# Patient Record
Sex: FEMALE | Race: BLACK OR AFRICAN AMERICAN | NOT HISPANIC OR LATINO
[De-identification: names, ages, dates, MRNs, and addresses within clinical notes are randomized per-mention and may not be internally consistent; named-entity substitution may affect disease eponyms.]

---

## 2017-09-20 PROBLEM — Z00.00 ENCOUNTER FOR PREVENTIVE HEALTH EXAMINATION: Status: ACTIVE | Noted: 2017-09-20

## 2017-10-20 ENCOUNTER — APPOINTMENT (OUTPATIENT)
Dept: GASTROENTEROLOGY | Facility: CLINIC | Age: 67
End: 2017-10-20
Payer: COMMERCIAL

## 2017-10-20 PROCEDURE — 99205 OFFICE O/P NEW HI 60 MIN: CPT

## 2017-10-20 PROCEDURE — 82274 ASSAY TEST FOR BLOOD FECAL: CPT | Mod: QW

## 2017-11-15 ENCOUNTER — APPOINTMENT (OUTPATIENT)
Dept: GASTROENTEROLOGY | Facility: CLINIC | Age: 67
End: 2017-11-15
Payer: COMMERCIAL

## 2017-11-15 PROCEDURE — 43239 EGD BIOPSY SINGLE/MULTIPLE: CPT | Mod: 59

## 2017-11-15 PROCEDURE — 45378 DIAGNOSTIC COLONOSCOPY: CPT

## 2017-12-07 ENCOUNTER — RESULT REVIEW (OUTPATIENT)
Age: 67
End: 2017-12-07

## 2018-05-08 ENCOUNTER — APPOINTMENT (OUTPATIENT)
Dept: GASTROENTEROLOGY | Facility: CLINIC | Age: 68
End: 2018-05-08

## 2018-06-28 ENCOUNTER — LABORATORY RESULT (OUTPATIENT)
Age: 68
End: 2018-06-28

## 2018-06-28 ENCOUNTER — APPOINTMENT (OUTPATIENT)
Dept: GASTROENTEROLOGY | Facility: CLINIC | Age: 68
End: 2018-06-28
Payer: MEDICARE

## 2018-06-28 VITALS
DIASTOLIC BLOOD PRESSURE: 74 MMHG | WEIGHT: 175 LBS | SYSTOLIC BLOOD PRESSURE: 128 MMHG | HEART RATE: 74 BPM | BODY MASS INDEX: 32.2 KG/M2 | HEIGHT: 62 IN

## 2018-06-28 DIAGNOSIS — F41.9 ANXIETY DISORDER, UNSPECIFIED: ICD-10-CM

## 2018-06-28 DIAGNOSIS — R73.03 PREDIABETES.: ICD-10-CM

## 2018-06-28 DIAGNOSIS — R05 COUGH: ICD-10-CM

## 2018-06-28 DIAGNOSIS — F32.9 MAJOR DEPRESSIVE DISORDER, SINGLE EPISODE, UNSPECIFIED: ICD-10-CM

## 2018-06-28 DIAGNOSIS — E78.5 HYPERLIPIDEMIA, UNSPECIFIED: ICD-10-CM

## 2018-06-28 DIAGNOSIS — Z83.79 FAMILY HISTORY OF OTHER DISEASES OF THE DIGESTIVE SYSTEM: ICD-10-CM

## 2018-06-28 PROCEDURE — 99214 OFFICE O/P EST MOD 30 MIN: CPT | Mod: 25

## 2018-06-28 PROCEDURE — 36415 COLL VENOUS BLD VENIPUNCTURE: CPT

## 2018-06-28 PROCEDURE — 82274 ASSAY TEST FOR BLOOD FECAL: CPT | Mod: QW

## 2018-06-28 RX ORDER — ASPIRIN 81 MG
81 TABLET, DELAYED RELEASE (ENTERIC COATED) ORAL
Refills: 0 | Status: ACTIVE | COMMUNITY

## 2018-06-28 RX ORDER — TELMISARTAN 80 MG/1
80 TABLET ORAL
Qty: 90 | Refills: 0 | Status: ACTIVE | COMMUNITY
Start: 2017-12-12

## 2018-06-28 RX ORDER — ATORVASTATIN CALCIUM 40 MG/1
40 TABLET, FILM COATED ORAL
Qty: 90 | Refills: 0 | Status: ACTIVE | COMMUNITY
Start: 2017-04-04

## 2018-06-29 LAB
ALBUMIN SERPL ELPH-MCNC: 4.7 G/DL
ALP BLD-CCNC: 82 U/L
ALT SERPL-CCNC: 27 U/L
ANION GAP SERPL CALC-SCNC: 16 MMOL/L
AST SERPL-CCNC: 27 U/L
BASOPHILS # BLD AUTO: 0.03 K/UL
BASOPHILS NFR BLD AUTO: 0.6 %
BILIRUB DIRECT SERPL-MCNC: 0.1 MG/DL
BILIRUB SERPL-MCNC: 0.4 MG/DL
BUN SERPL-MCNC: 15 MG/DL
CALCIUM SERPL-MCNC: 10.1 MG/DL
CHLORIDE SERPL-SCNC: 99 MMOL/L
CO2 SERPL-SCNC: 26 MMOL/L
CREAT SERPL-MCNC: 0.8 MG/DL
EOSINOPHIL # BLD AUTO: 0.15 K/UL
EOSINOPHIL NFR BLD AUTO: 3.2 %
GGT SERPL-CCNC: 22 U/L
GLUCOSE SERPL-MCNC: 115 MG/DL
HBA1C MFR BLD HPLC: 6.1 %
HCT VFR BLD CALC: 42.4 %
HGB BLD-MCNC: 13.4 G/DL
IMM GRANULOCYTES NFR BLD AUTO: 0.4 %
IRON SATN MFR SERPL: 29 %
IRON SERPL-MCNC: 90 UG/DL
LYMPHOCYTES # BLD AUTO: 1.74 K/UL
LYMPHOCYTES NFR BLD AUTO: 37 %
MAGNESIUM SERPL-MCNC: 1.8 MG/DL
MAN DIFF?: NORMAL
MCHC RBC-ENTMCNC: 30 PG
MCHC RBC-ENTMCNC: 31.6 GM/DL
MCV RBC AUTO: 95.1 FL
MONOCYTES # BLD AUTO: 0.49 K/UL
MONOCYTES NFR BLD AUTO: 10.4 %
NEUTROPHILS # BLD AUTO: 2.27 K/UL
NEUTROPHILS NFR BLD AUTO: 48.4 %
PHOSPHATE SERPL-MCNC: 3.1 MG/DL
PLATELET # BLD AUTO: 309 K/UL
POTASSIUM SERPL-SCNC: 4.4 MMOL/L
PROT SERPL-MCNC: 7.9 G/DL
RBC # BLD: 4.46 M/UL
RBC # FLD: 14.4 %
SODIUM SERPL-SCNC: 141 MMOL/L
TIBC SERPL-MCNC: 312 UG/DL
UIBC SERPL-MCNC: 222 UG/DL
WBC # FLD AUTO: 4.7 K/UL

## 2018-07-02 LAB
FERRITIN SERPL-MCNC: 389 NG/ML
T3 SERPL-MCNC: 127 NG/DL
T3RU NFR SERPL: 1.18 INDEX
T4 FREE SERPL-MCNC: 1.1 NG/DL
T4 SERPL-MCNC: 8 UG/DL
TSH SERPL-ACNC: 2.06 UIU/ML

## 2019-02-11 ENCOUNTER — INBOUND DOCUMENT (OUTPATIENT)
Age: 69
End: 2019-02-11

## 2019-08-09 ENCOUNTER — RX RENEWAL (OUTPATIENT)
Age: 69
End: 2019-08-09

## 2019-10-10 ENCOUNTER — APPOINTMENT (OUTPATIENT)
Dept: GASTROENTEROLOGY | Facility: CLINIC | Age: 69
End: 2019-10-10
Payer: MEDICARE

## 2019-10-10 VITALS
HEIGHT: 62 IN | WEIGHT: 171 LBS | BODY MASS INDEX: 31.47 KG/M2 | DIASTOLIC BLOOD PRESSURE: 66 MMHG | SYSTOLIC BLOOD PRESSURE: 129 MMHG | HEART RATE: 71 BPM

## 2019-10-10 DIAGNOSIS — Z86.010 PERSONAL HISTORY OF COLONIC POLYPS: ICD-10-CM

## 2019-10-10 DIAGNOSIS — K25.9 GASTRIC ULCER, UNSPECIFIED AS ACUTE OR CHRONIC, W/OUT HEMORRHAGE OR PERFORATION: ICD-10-CM

## 2019-10-10 DIAGNOSIS — H40.9 UNSPECIFIED GLAUCOMA: ICD-10-CM

## 2019-10-10 PROCEDURE — 99214 OFFICE O/P EST MOD 30 MIN: CPT

## 2019-10-10 PROCEDURE — 82274 ASSAY TEST FOR BLOOD FECAL: CPT | Mod: QW

## 2019-10-10 NOTE — PHYSICAL EXAM
[General Appearance - Alert] : alert [General Appearance - Well Nourished] : well nourished [General Appearance - In No Acute Distress] : in no acute distress [General Appearance - Well Developed] : well developed [Sclera] : the sclera and conjunctiva were normal [Outer Ear] : the ears and nose were normal in appearance [Oropharynx] : the oropharynx was normal [Neck Appearance] : the appearance of the neck was normal [Neck Cervical Mass (___cm)] : no neck mass was observed [Jugular Venous Distention Increased] : there was no jugular-venous distention [Thyroid Nodule] : there were no palpable thyroid nodules [Thyroid Diffuse Enlargement] : the thyroid was not enlarged [Auscultation Breath Sounds / Voice Sounds] : lungs were clear to auscultation bilaterally [Heart Sounds] : normal S1 and S2 [Heart Sounds Gallop] : no gallops [Heart Rate And Rhythm] : heart rate was normal and rhythm regular [Heart Sounds Pericardial Friction Rub] : no pericardial rub [Murmurs] : no murmurs [Edema] : there was no peripheral edema [Full Pulse] : the pedal pulses are present [Abdomen Soft] : soft [Bowel Sounds] : normal bowel sounds [Abdomen Tenderness] : non-tender [Abdomen Hernia] : no hernia was discovered [Abdomen Mass (___ Cm)] : no abdominal mass palpated [Normal Sphincter Tone] : normal sphincter tone [No Rectal Mass] : no rectal mass [Cervical Lymph Nodes Enlarged Posterior Bilaterally] : posterior cervical [Cervical Lymph Nodes Enlarged Anterior Bilaterally] : anterior cervical [Supraclavicular Lymph Nodes Enlarged Bilaterally] : supraclavicular [Axillary Lymph Nodes Enlarged Bilaterally] : axillary [Femoral Lymph Nodes Enlarged Bilaterally] : femoral [Inguinal Lymph Nodes Enlarged Bilaterally] : inguinal [No CVA Tenderness] : no ~M costovertebral angle tenderness [No Spinal Tenderness] : no spinal tenderness [Abnormal Walk] : normal gait [Nail Clubbing] : no clubbing  or cyanosis of the fingernails [Motor Tone] : muscle strength and tone were normal [Musculoskeletal - Swelling] : no joint swelling seen [Skin Color & Pigmentation] : normal skin color and pigmentation [Skin Turgor] : normal skin turgor [] : no rash [Oriented To Time, Place, And Person] : oriented to person, place, and time [Affect] : the affect was normal [Impaired Insight] : insight and judgment were intact [Internal Hemorrhoid] : no internal hemorrhoids [External Hemorrhoid] : no external hemorrhoids [FreeTextEntry1] : FIT negative [Occult Blood Positive] : stool was negative for occult blood

## 2019-10-10 NOTE — REVIEW OF SYSTEMS
[Eyesight Problems] : eyesight problems [Dry Eyes] : dryness of the eyes [Lower Ext Edema] : lower extremity edema [Cough] : cough [Hot Flashes] : hot flashes [Negative] : Heme/Lymph

## 2019-10-10 NOTE — ASSESSMENT
[FreeTextEntry1] : 1. Iron deficiency anemia may be related to the ASA-induced gastric erosions and erosive esophagitis noted at EGD November 2017, possible colorectal neoplasia, etc..\par 2. Remote history of colonic polyps, with incomplete colonoscopy November 2017; family history of colonic polyps (sisters). Stool guaiac negative with negative FIT on today's exam. \par 3. Obesity.\par 4. Hypertension.\par 5. Hyperlipidemia.\par 6. Prediabetes.\par 7. History of TIA, maintained on baby aspirin.\par 8. History of anxiety/depression.\par 9. Chronic cough, attributed to Micardis.\par 10. Glaucoma, possible optic nerve disease. \par 11. Status post total abdominal hysterectomy, left breast lumpectomy, lipoma excision.\par \par Plan:\par 1. Dr. Azul to forward labs for my review.\par 2. Continue PPI as co-therapy with aspirin indefinitely.\par 3. Advised another attempt at colonoscopy (to visualize the remainder of the colon) this autumn, after eye issues have been fully elucidated. Procedure, rationale, alternatives, material risks, anesthesia plan, MiraLax prep instructions were reviewed and brochure given.\par \par

## 2019-10-10 NOTE — CONSULT LETTER
[Dear  ___] : Dear  [unfilled], [Courtesy Letter:] : I had the pleasure of seeing your patient, [unfilled], in my office today. [Please see my note below.] : Please see my note below. [Sincerely,] : Sincerely, [Consult Closing:] : Thank you very much for allowing me to participate in the care of this patient.  If you have any questions, please do not hesitate to contact me. [FreeTextEntry3] : Reginaldo Edouard M.D.\par

## 2019-10-10 NOTE — HISTORY OF PRESENT ILLNESS
[FreeTextEntry1] : Reflux symptoms continue to be well-controlled on omeprazole 40 mg daily, with "less acidy stomach" and less burping than at last visit here. EGD 11/15/17 revealed gastric erosions (ASA-induced), grade A erosive esophagitis, and hiatal hernia with patulous Schatzki ring. Repeat colonoscopy 11/15/17 to the ?mid-ascending colon (proximal right colon not visualized) revealed tortuosity and looping of the scope, with hemorrhoids; virtual colonoscopy has been advised on several occasions, but she has never followed through with that recommendation. She continues on ASA & iron supplement daily. Since last visit, she was diagnosed with glaucoma and possible optic nerve issue, for which she will be undergoing MRI shortly (although she has claustrophobia, so needs to contact Dr. Azul about pre-medication for the study).

## 2020-10-13 ENCOUNTER — RX RENEWAL (OUTPATIENT)
Age: 70
End: 2020-10-13

## 2020-11-30 ENCOUNTER — RX RENEWAL (OUTPATIENT)
Age: 70
End: 2020-11-30

## 2021-01-13 ENCOUNTER — RX RENEWAL (OUTPATIENT)
Age: 71
End: 2021-01-13

## 2021-06-09 ENCOUNTER — RX RENEWAL (OUTPATIENT)
Age: 71
End: 2021-06-09

## 2021-08-02 ENCOUNTER — RX RENEWAL (OUTPATIENT)
Age: 71
End: 2021-08-02

## 2021-08-16 ENCOUNTER — APPOINTMENT (OUTPATIENT)
Dept: OBGYN | Facility: CLINIC | Age: 71
End: 2021-08-16
Payer: MEDICARE

## 2021-08-16 ENCOUNTER — NON-APPOINTMENT (OUTPATIENT)
Age: 71
End: 2021-08-16

## 2021-08-16 VITALS
WEIGHT: 178 LBS | DIASTOLIC BLOOD PRESSURE: 80 MMHG | BODY MASS INDEX: 32.76 KG/M2 | HEIGHT: 62 IN | SYSTOLIC BLOOD PRESSURE: 120 MMHG

## 2021-08-16 DIAGNOSIS — Z86.73 PERSONAL HISTORY OF TRANSIENT ISCHEMIC ATTACK (TIA), AND CEREBRAL INFARCTION W/OUT RESIDUAL DEFICITS: ICD-10-CM

## 2021-08-16 DIAGNOSIS — I10 ESSENTIAL (PRIMARY) HYPERTENSION: ICD-10-CM

## 2021-08-16 DIAGNOSIS — Z86.59 PERSONAL HISTORY OF OTHER MENTAL AND BEHAVIORAL DISORDERS: ICD-10-CM

## 2021-08-16 DIAGNOSIS — Z80.3 FAMILY HISTORY OF MALIGNANT NEOPLASM OF BREAST: ICD-10-CM

## 2021-08-16 DIAGNOSIS — Z11.51 ENCOUNTER FOR SCREENING FOR HUMAN PAPILLOMAVIRUS (HPV): ICD-10-CM

## 2021-08-16 DIAGNOSIS — Z83.3 FAMILY HISTORY OF DIABETES MELLITUS: ICD-10-CM

## 2021-08-16 DIAGNOSIS — Z86.69 PERSONAL HISTORY OF OTHER DISEASES OF THE NERVOUS SYSTEM AND SENSE ORGANS: ICD-10-CM

## 2021-08-16 DIAGNOSIS — Z86.79 PERSONAL HISTORY OF OTHER DISEASES OF THE CIRCULATORY SYSTEM: ICD-10-CM

## 2021-08-16 PROCEDURE — 99397 PER PM REEVAL EST PAT 65+ YR: CPT

## 2021-08-16 RX ORDER — METOPROLOL SUCCINATE 100 MG/1
100 TABLET, EXTENDED RELEASE ORAL
Qty: 90 | Refills: 0 | Status: DISCONTINUED | COMMUNITY
Start: 2018-03-06 | End: 2021-08-16

## 2021-08-16 RX ORDER — OMEPRAZOLE 40 MG/1
40 CAPSULE, DELAYED RELEASE ORAL
Qty: 90 | Refills: 0 | Status: DISCONTINUED | COMMUNITY
Start: 2017-11-15 | End: 2021-08-16

## 2021-08-16 RX ORDER — METOPROLOL SUCCINATE 50 MG/1
50 TABLET, EXTENDED RELEASE ORAL
Qty: 90 | Refills: 0 | Status: DISCONTINUED | COMMUNITY
Start: 2018-06-12 | End: 2021-08-16

## 2021-08-16 RX ORDER — HYDROCHLOROTHIAZIDE 25 MG/1
25 TABLET ORAL
Qty: 30 | Refills: 0 | Status: DISCONTINUED | COMMUNITY
Start: 2017-12-20 | End: 2021-08-16

## 2021-08-19 LAB
CYTOLOGY CVX/VAG DOC THIN PREP: ABNORMAL
HPV HIGH+LOW RISK DNA PNL CVX: NOT DETECTED

## 2021-08-22 PROBLEM — Z86.79 HISTORY OF HYPERTENSION: Status: RESOLVED | Noted: 2021-08-16 | Resolved: 2021-08-22

## 2021-08-22 PROBLEM — Z86.59 HISTORY OF DEPRESSION: Status: RESOLVED | Noted: 2021-08-16 | Resolved: 2021-08-22

## 2021-08-22 PROBLEM — Z80.3 FAMILY HISTORY OF MALIGNANT NEOPLASM OF BREAST: Status: ACTIVE | Noted: 2021-08-16

## 2021-08-22 PROBLEM — I10 HYPERTENSION, UNSPECIFIED TYPE: Status: ACTIVE | Noted: 2018-06-28

## 2021-08-22 PROBLEM — Z86.73 HISTORY OF TIA (TRANSIENT ISCHEMIC ATTACK): Status: ACTIVE | Noted: 2018-06-28

## 2021-08-22 PROBLEM — Z86.69 HISTORY OF GLAUCOMA: Status: RESOLVED | Noted: 2021-08-16 | Resolved: 2021-08-22

## 2021-08-22 PROBLEM — Z83.3 FAMILY HISTORY OF DIABETES MELLITUS: Status: ACTIVE | Noted: 2021-08-16

## 2021-08-22 RX ORDER — SPIRONOLACTONE 50 MG/1
TABLET ORAL
Refills: 0 | Status: ACTIVE | COMMUNITY

## 2021-08-22 RX ORDER — AMLODIPINE BESYLATE 5 MG/1
5 TABLET ORAL
Refills: 0 | Status: ACTIVE | COMMUNITY
Start: 2018-03-06

## 2021-08-22 RX ORDER — FUROSEMIDE 80 MG/1
TABLET ORAL
Refills: 0 | Status: ACTIVE | COMMUNITY

## 2021-08-22 RX ORDER — CARVEDILOL 3.12 MG/1
TABLET, FILM COATED ORAL
Refills: 0 | Status: ACTIVE | COMMUNITY

## 2022-02-28 ENCOUNTER — LABORATORY RESULT (OUTPATIENT)
Age: 72
End: 2022-02-28

## 2022-02-28 ENCOUNTER — APPOINTMENT (OUTPATIENT)
Dept: GASTROENTEROLOGY | Facility: CLINIC | Age: 72
End: 2022-02-28
Payer: MEDICARE

## 2022-02-28 VITALS
WEIGHT: 178 LBS | SYSTOLIC BLOOD PRESSURE: 139 MMHG | HEART RATE: 76 BPM | DIASTOLIC BLOOD PRESSURE: 65 MMHG | BODY MASS INDEX: 32.76 KG/M2 | HEIGHT: 62 IN

## 2022-02-28 DIAGNOSIS — D50.9 IRON DEFICIENCY ANEMIA, UNSPECIFIED: ICD-10-CM

## 2022-02-28 DIAGNOSIS — E66.9 OBESITY, UNSPECIFIED: ICD-10-CM

## 2022-02-28 DIAGNOSIS — R14.0 ABDOMINAL DISTENSION (GASEOUS): ICD-10-CM

## 2022-02-28 DIAGNOSIS — K21.00 GASTRO-ESOPHAGEAL REFLUX DISEASE WITH ESOPHAGITIS, WITHOUT BLEEDING: ICD-10-CM

## 2022-02-28 PROCEDURE — 82274 ASSAY TEST FOR BLOOD FECAL: CPT | Mod: QW

## 2022-02-28 PROCEDURE — 36415 COLL VENOUS BLD VENIPUNCTURE: CPT

## 2022-02-28 PROCEDURE — 99215 OFFICE O/P EST HI 40 MIN: CPT | Mod: 25

## 2022-02-28 RX ORDER — PAROXETINE HYDROCHLORIDE 20 MG/1
20 TABLET, FILM COATED ORAL
Qty: 30 | Refills: 0 | Status: DISCONTINUED | COMMUNITY
Start: 2017-11-28 | End: 2022-02-28

## 2022-02-28 NOTE — REVIEW OF SYSTEMS
[Cough] : cough [Constipation] : constipation [Heartburn] : heartburn [Anxiety] : anxiety [Negative] : Heme/Lymph [As Noted in HPI] : as noted in HPI

## 2022-02-28 NOTE — CONSULT LETTER
[Dear  ___] : Dear  [unfilled], [Courtesy Letter:] : I had the pleasure of seeing your patient, [unfilled], in my office today. [Please see my note below.] : Please see my note below. [Consult Closing:] : Thank you very much for allowing me to participate in the care of this patient.  If you have any questions, please do not hesitate to contact me. [Sincerely,] : Sincerely, [FreeTextEntry3] : Reginaldo Edouard M.D.\par

## 2022-02-28 NOTE — PHYSICAL EXAM
[General Appearance - Alert] : alert [General Appearance - In No Acute Distress] : in no acute distress [General Appearance - Well Nourished] : well nourished [General Appearance - Well Developed] : well developed [Sclera] : the sclera and conjunctiva were normal [Neck Appearance] : the appearance of the neck was normal [Neck Cervical Mass (___cm)] : no neck mass was observed [Jugular Venous Distention Increased] : there was no jugular-venous distention [Thyroid Diffuse Enlargement] : the thyroid was not enlarged [Thyroid Nodule] : there were no palpable thyroid nodules [Auscultation Breath Sounds / Voice Sounds] : lungs were clear to auscultation bilaterally [Heart Rate And Rhythm] : heart rate was normal and rhythm regular [Heart Sounds] : normal S1 and S2 [Heart Sounds Gallop] : no gallops [Murmurs] : no murmurs [Heart Sounds Pericardial Friction Rub] : no pericardial rub [Full Pulse] : the pedal pulses are present [Edema] : there was no peripheral edema [Bowel Sounds] : normal bowel sounds [Abdomen Soft] : soft [Abdomen Tenderness] : non-tender [Abdomen Mass (___ Cm)] : no abdominal mass palpated [Abdomen Hernia] : no hernia was discovered [Normal Sphincter Tone] : normal sphincter tone [No Rectal Mass] : no rectal mass [Internal Hemorrhoid] : internal hemorrhoids [Cervical Lymph Nodes Enlarged Posterior Bilaterally] : posterior cervical [Cervical Lymph Nodes Enlarged Anterior Bilaterally] : anterior cervical [Supraclavicular Lymph Nodes Enlarged Bilaterally] : supraclavicular [Inguinal Lymph Nodes Enlarged Bilaterally] : inguinal [Nail Clubbing] : no clubbing  or cyanosis of the fingernails [Musculoskeletal - Swelling] : no joint swelling seen [Skin Color & Pigmentation] : normal skin color and pigmentation [Skin Turgor] : normal skin turgor [] : no rash [Oriented To Time, Place, And Person] : oriented to person, place, and time [Impaired Insight] : insight and judgment were intact [Affect] : the affect was normal [External Hemorrhoid] : no external hemorrhoids [Occult Blood Positive] : stool was negative for occult blood [FreeTextEntry1] : FIT negative

## 2022-02-28 NOTE — ASSESSMENT
[FreeTextEntry1] : 1.  Elevated liver chemistries, with ultrasound reportedly revealing fatty liver--multiple risk factors.  She has had increased risk for evolution towards cirrhosis, HCC.  Rule out superimposed viral and/or autoimmune hepatitis, drug-induced liver injury, celiac disease, etc.\par 2.  History of iron deficiency anemia, may have been related to the ASA-induced gastric erosions and erosive esophagitis noted at EGD November 2017.\par 3. Remote history of colonic polyps, with incomplete colonoscopy November 2017; family history of colonic polyps (sisters). Stool guaiac negative with negative FIT on today's exam.  Gassiness may be related to IBS-C, medicines, diet, etc.\par 4. Obesity.\par 5. Hypertension.\par 6. Hyperlipidemia.\par 7. Prediabetes.\par 8. History of TIA, maintained on baby aspirin.\par 9. History of anxiety/depression.\par 10. Chronic cough, attributed to Micardis.\par 11. Glaucoma, possible optic nerve disease. \par 12. Status post total abdominal hysterectomy, left breast lumpectomy, lipoma excision.\par \par Plan:\par 1. Dr. Azul to forward lab and ultrasound reports for my review.\par 2. Extensive bloodwork drawn by me this afternoon.\par 3. Diet, lose weight (aim for >10% loss), exercise more, and continue abstinence from alcohol. \par 4. Handout on "NAFLD" given and discussed. \par 5. Return here later this spring.\par 6. Consider another attempt at colonoscopy later this year, if the potential benefits exceed the risks at that point.

## 2022-02-28 NOTE — HISTORY OF PRESENT ILLNESS
[FreeTextEntry1] : Gregoria was advised of abnormal liver chemistries last year, and once again earlier this year.  Abdominal ultrasound several weeks ago reportedly revealed fatty liver.  She reports chronic gassiness, the bloating and flatulence.  She also tends towards constipation, taking 2 Senokot at bedtime.  She wonders if the -statin could be the cause of the elevated chemistries.  Repeat colonoscopy November 2017 to the?  Mid-ascending colon revealed tortuosity and looping of the scope, with hemorrhoids; virtual colonoscopy as advised on several occasions, because of claustrophobia, etc., she has never proceeded with that recommendation.  Same day EGD revealed aspirin due to gastric erosions, grade A erosive esophagitis, hiatal hernia with patulous Schatzki ring.

## 2022-02-28 NOTE — REASON FOR VISIT
[Follow-Up: _____] : a [unfilled] follow-up visit [FreeTextEntry1] : Pt has fatty liver, liver blood count high

## 2022-03-01 LAB
APTT BLD: 28.5 SEC
BASOPHILS # BLD AUTO: 0.08 K/UL
BASOPHILS NFR BLD AUTO: 1.2 %
EOSINOPHIL # BLD AUTO: 0.38 K/UL
EOSINOPHIL NFR BLD AUTO: 5.9 %
ESTIMATED AVERAGE GLUCOSE: 126 MG/DL
HBA1C MFR BLD HPLC: 6 %
HBV CORE IGG+IGM SER QL: NONREACTIVE
HBV SURFACE AB SER QL: NONREACTIVE
HBV SURFACE AG SER QL: NONREACTIVE
HCT VFR BLD CALC: 39.9 %
HCV AB SER QL: NONREACTIVE
HCV S/CO RATIO: 0.14 S/CO
HEPATITIS A IGG ANTIBODY: NONREACTIVE
HGB BLD-MCNC: 13.1 G/DL
IMM GRANULOCYTES NFR BLD AUTO: 0.3 %
INR PPP: 1.12 RATIO
LYMPHOCYTES # BLD AUTO: 2.45 K/UL
LYMPHOCYTES NFR BLD AUTO: 38.1 %
MAN DIFF?: NORMAL
MCHC RBC-ENTMCNC: 31.8 PG
MCHC RBC-ENTMCNC: 32.8 GM/DL
MCV RBC AUTO: 96.8 FL
MONOCYTES # BLD AUTO: 0.64 K/UL
MONOCYTES NFR BLD AUTO: 10 %
NEUTROPHILS # BLD AUTO: 2.86 K/UL
NEUTROPHILS NFR BLD AUTO: 44.5 %
PLATELET # BLD AUTO: 282 K/UL
PT BLD: 13.1 SEC
RBC # BLD: 4.12 M/UL
RBC # FLD: 13.9 %
WBC # FLD AUTO: 6.43 K/UL

## 2022-03-02 LAB
AFP-TM SERPL-MCNC: 9.3 NG/ML
ALBUMIN SERPL ELPH-MCNC: 4.5 G/DL
ALP BLD-CCNC: 89 U/L
ALT SERPL-CCNC: 81 U/L
AMYLASE/CREAT SERPL: 110 U/L
ANION GAP SERPL CALC-SCNC: 14 MMOL/L
AST SERPL-CCNC: 56 U/L
BILIRUB DIRECT SERPL-MCNC: 0.1 MG/DL
BILIRUB SERPL-MCNC: 0.3 MG/DL
BUN SERPL-MCNC: 11 MG/DL
CALCIUM SERPL-MCNC: 9.7 MG/DL
CHLORIDE SERPL-SCNC: 103 MMOL/L
CHOLEST SERPL-MCNC: 153 MG/DL
CO2 SERPL-SCNC: 23 MMOL/L
CREAT SERPL-MCNC: 0.8 MG/DL
EGFR: 79 ML/MIN/1.73M2
FERRITIN SERPL-MCNC: 787 NG/ML
GGT SERPL-CCNC: 34 U/L
GLUCOSE SERPL-MCNC: 89 MG/DL
HDLC SERPL-MCNC: 56 MG/DL
IGA SER QL IEP: 440 MG/DL
IRON SATN MFR SERPL: 39 %
IRON SERPL-MCNC: 109 UG/DL
LDLC SERPL CALC-MCNC: 79 MG/DL
LPL SERPL-CCNC: 30 U/L
MAGNESIUM SERPL-MCNC: 1.8 MG/DL
NONHDLC SERPL-MCNC: 97 MG/DL
PHOSPHATE SERPL-MCNC: 3.6 MG/DL
POTASSIUM SERPL-SCNC: 4.4 MMOL/L
PROT SERPL-MCNC: 8 G/DL
SODIUM SERPL-SCNC: 140 MMOL/L
T3 SERPL-MCNC: 141 NG/DL
T3RU NFR SERPL: 1.3 TBI
T4 FREE SERPL-MCNC: 1 NG/DL
T4 SERPL-MCNC: 8.7 UG/DL
TIBC SERPL-MCNC: 281 UG/DL
TRIGL SERPL-MCNC: 90 MG/DL
TSH SERPL-ACNC: 1.78 UIU/ML
UIBC SERPL-MCNC: 172 UG/DL

## 2022-03-03 LAB
GLIADIN IGA SER QL: 9.6 UNITS
GLIADIN IGG SER QL: <5 UNITS
GLIADIN PEPTIDE IGA SER-ACNC: NEGATIVE
GLIADIN PEPTIDE IGG SER-ACNC: NEGATIVE
TTG IGA SER IA-ACNC: <1.2 U/ML
TTG IGA SER-ACNC: NEGATIVE
TTG IGG SER IA-ACNC: 5.5 U/ML
TTG IGG SER IA-ACNC: NEGATIVE

## 2022-03-04 ENCOUNTER — NON-APPOINTMENT (OUTPATIENT)
Age: 72
End: 2022-03-04

## 2022-03-07 ENCOUNTER — NON-APPOINTMENT (OUTPATIENT)
Age: 72
End: 2022-03-07

## 2022-03-07 LAB
ANA PAT FLD IF-IMP: ABNORMAL
ANA SER IF-ACNC: ABNORMAL
MITOCHONDRIA AB SER IF-ACNC: NORMAL
SMOOTH MUSCLE AB SER QL IF: NORMAL

## 2022-03-07 RX ORDER — GLUC/MSM/COLGN2/HYAL/ANTIARTH3 375-375-20
TABLET ORAL
Refills: 0 | Status: DISCONTINUED | COMMUNITY
End: 2022-03-07

## 2022-03-10 LAB
ENDOMYSIUM IGA SER QL: NEGATIVE
ENDOMYSIUM IGA TITR SER: NORMAL

## 2022-06-07 ENCOUNTER — APPOINTMENT (OUTPATIENT)
Dept: GASTROENTEROLOGY | Facility: CLINIC | Age: 72
End: 2022-06-07
Payer: MEDICARE

## 2022-06-07 VITALS
HEIGHT: 62 IN | SYSTOLIC BLOOD PRESSURE: 131 MMHG | WEIGHT: 179 LBS | HEART RATE: 78 BPM | BODY MASS INDEX: 32.94 KG/M2 | DIASTOLIC BLOOD PRESSURE: 64 MMHG

## 2022-06-07 PROCEDURE — 99214 OFFICE O/P EST MOD 30 MIN: CPT

## 2022-06-07 NOTE — ASSESSMENT
[FreeTextEntry1] : 1.  Mildly elevated liver chemistries, with elevated ferritin and HERSON and AFP, and ultrasound suggesting fatty liver--I suspect NAFLD, with secondary iron overload.  Possible autoimmune hepatitis but I doubt that she would be a candidate for treatment.  Drug-induced liver injury would be another possibility.  No immunity vs. hepatitis A&B.\par 2.  History of iron deficiency anemia, may have been related to the ASA-induced gastric erosions and erosive esophagitis noted at EGD November 2017.\par 3. Remote history of colonic polyps, with incomplete colonoscopy November 2017; family history of colonic polyps (sisters). Stool guaiac negative with negative FIT on today's exam.  Gassiness may be related to IBS-C, medicines, diet, etc.\par 4. Obesity.\par 5. Hypertension.\par 6. Hyperlipidemia.\par 7. Prediabetes.\par 8. History of TIA, maintained on baby aspirin.\par 9. History of anxiety/depression.\par 10. Chronic cough, attributed to Micardis.\par 11. Glaucoma, possible optic nerve disease. \par 12. Status post total abdominal hysterectomy, left breast lumpectomy, lipoma excision.\par \par Plan:\par 1.  Dr. Azul to forward latest lab report for my review.\par 2.  At this point, although liver biopsy could be performed to further elucidate, I suspect that the potential risks may outweigh the benefits.\par 3.  Nevertheless, suggest formal consultation with Hepatology.  She should receive "standard" vaccinations.  FibroScan may be recommended as a baseline.  They could also give input regarding whether they feel that liver biopsy is justified.\par 4.  Return this autumn, for further discussion regarding surveillance colonoscopy.\par 5.  Dietary instruction given. Continue attempts at gradual weight loss, and minimize "high-iron" food or vitamin that contains iron.

## 2022-06-07 NOTE — HISTORY OF PRESENT ILLNESS
[FreeTextEntry1] : Labs from 2/28/2022 revealed AST 56, ALT 81, ferritin 787, HERSON 1:1280 (homogeneous), and AFP 9.3.  Abdominal ultrasound 2/4/2022 revealed mild increased hepatic echogenicity without liver mass or atilio cirrhosis.  She had held her cholesterol medicine for a while, but because of history of TIA and other co-morbidities, this was just resumed last week. She thought that she had lost a few pounds since last visit, and had labs repeated recently, advised of improvement (not currently available for my review).

## 2022-06-07 NOTE — REVIEW OF SYSTEMS
[Dry Eyes] : dryness of the eyes [Eyes Itch] : itching of the eyes [Cough] : cough [Negative] : Heme/Lymph

## 2022-10-31 ENCOUNTER — APPOINTMENT (OUTPATIENT)
Dept: OBGYN | Facility: CLINIC | Age: 72
End: 2022-10-31

## 2022-10-31 VITALS
SYSTOLIC BLOOD PRESSURE: 126 MMHG | WEIGHT: 178 LBS | BODY MASS INDEX: 32.76 KG/M2 | HEIGHT: 62 IN | DIASTOLIC BLOOD PRESSURE: 70 MMHG

## 2022-10-31 DIAGNOSIS — Z01.411 ENCOUNTER FOR GYNECOLOGICAL EXAMINATION (GENERAL) (ROUTINE) WITH ABNORMAL FINDINGS: ICD-10-CM

## 2022-10-31 DIAGNOSIS — B97.7 PAPILLOMAVIRUS AS THE CAUSE OF DISEASES CLASSIFIED ELSEWHERE: ICD-10-CM

## 2022-10-31 PROCEDURE — 99213 OFFICE O/P EST LOW 20 MIN: CPT

## 2022-11-02 ENCOUNTER — APPOINTMENT (OUTPATIENT)
Dept: GASTROENTEROLOGY | Facility: CLINIC | Age: 72
End: 2022-11-02

## 2022-11-02 VITALS
DIASTOLIC BLOOD PRESSURE: 69 MMHG | HEART RATE: 67 BPM | HEIGHT: 62 IN | WEIGHT: 176 LBS | SYSTOLIC BLOOD PRESSURE: 134 MMHG | BODY MASS INDEX: 32.39 KG/M2

## 2022-11-02 DIAGNOSIS — I50.9 HEART FAILURE, UNSPECIFIED: ICD-10-CM

## 2022-11-02 DIAGNOSIS — K76.0 FATTY (CHANGE OF) LIVER, NOT ELSEWHERE CLASSIFIED: ICD-10-CM

## 2022-11-02 DIAGNOSIS — Z83.71 FAMILY HISTORY OF COLONIC POLYPS: ICD-10-CM

## 2022-11-02 DIAGNOSIS — R74.8 ABNORMAL LEVELS OF OTHER SERUM ENZYMES: ICD-10-CM

## 2022-11-02 DIAGNOSIS — K59.09 OTHER CONSTIPATION: ICD-10-CM

## 2022-11-02 PROCEDURE — 99214 OFFICE O/P EST MOD 30 MIN: CPT

## 2022-11-02 NOTE — PHYSICAL EXAM
[No Acute Distress] : no acute distress [Well Developed] : well developed [Well Nourished] : well nourished [Obese (BMI >= 30)] : obese (BMI >= 30) [None] : no edema [Bowel Sounds] : normal bowel sounds [Abdomen Tenderness] : non-tender [Normal Color / Pigmentation] : normal skin color and pigmentation [Normal] : oriented to person, place, and time [de-identified] : surgical scars

## 2022-11-02 NOTE — CONSULT LETTER
[Dear  ___] : Dear  [unfilled], [Courtesy Letter:] : I had the pleasure of seeing your patient, [unfilled], in my office today. [Please see my note below.] : Please see my note below. [Consult Closing:] : Thank you very much for allowing me to participate in the care of this patient.  If you have any questions, please do not hesitate to contact me. [Sincerely,] : Sincerely, [FreeTextEntry3] : Reginaldo Edouard M.D.\par  [DrShabbir  ___] : Dr. JEFFRIES

## 2022-11-02 NOTE — HISTORY OF PRESENT ILLNESS
[FreeTextEntry1] : Gregoria reports issues with intermittent constipation and gassiness.  She takes one Senokot with stool softener every evening.  She has remote history of colonic polyps, with incomplete colonoscopy November 2017.  Her sisters had colonic polyps.  She also reports intermittent heartburn, does not take any medicine for that.  She mentioned that she was diagnosed with congestive heart failure 3-4 years ago, followed by Dr. Stewart Guerra ([facility]).  Repeat labs revealed improving aminotransferases.  She will be traveling to [place] over Thanksgiving.

## 2022-11-02 NOTE — ASSESSMENT
[FreeTextEntry1] : 1. Remote history of colonic polyps, with extremely difficult incomplete colonoscopy November 2017; family history of colonic polyps (sisters)--rule out colorectal neoplasia.  Constipation, gassiness may be related to IBS-C, medicines.\par 2. Mildly elevated liver chemistries, with elevated ferritin and HERSON and AFP, and ultrasound suggesting fatty liver--I suspect NAFLD.\par 3. History of iron deficiency anemia, may have been related to the ASA-induced gastric erosions and erosive esophagitis noted at EGD November 2017.  Occasional heartburn.\par 4. History of CHF, followed by Dr. Guerra.\par 5. Obesity.\par 6. Hypertension.\par 7. Hyperlipidemia.\par 8. Prediabetes.\par 9. History of TIA, maintained on baby aspirin.\par 10. History of anxiety/depression.\par 11. Chronic cough, attributed to Micardis.\par 12. Glaucoma, possible optic nerve disease. \par 13. Status post total abdominal hysterectomy, left breast lumpectomy, lipoma excision.\par \par Plan:\par 1.  Interim medical records reviewed.\par 2.  Given history of CHF, I am concerned about whether she is even a candidate for repeat colonoscopy.  She has an appointment to see Dr. Guerra later this month--cardiac evaluation necessary before contemplating repeat colonoscopy.  Given history of CHF, she would not be suitable for office anesthesia, so Dr. Gilliland could perform at the hospital on my behalf (if she is a candidate at all).\par 3.  Continue current bowel regimen.\par 4.  Return here after cardiac evaluation has been completed, for further discussion.

## 2022-11-07 LAB — CYTOLOGY CVX/VAG DOC THIN PREP: NORMAL

## 2023-04-22 RX ORDER — PAROXETINE HYDROCHLORIDE 20 MG/1
20 TABLET, FILM COATED ORAL DAILY
Qty: 90 | Refills: 0 | Status: ACTIVE | COMMUNITY
Start: 2022-01-19 | End: 1900-01-01

## 2023-05-22 ENCOUNTER — NON-APPOINTMENT (OUTPATIENT)
Age: 73
End: 2023-05-22

## 2023-12-05 ENCOUNTER — APPOINTMENT (OUTPATIENT)
Dept: OBGYN | Facility: CLINIC | Age: 73
End: 2023-12-05
Payer: MEDICARE

## 2023-12-05 VITALS
DIASTOLIC BLOOD PRESSURE: 74 MMHG | BODY MASS INDEX: 31.47 KG/M2 | HEIGHT: 62 IN | WEIGHT: 171 LBS | SYSTOLIC BLOOD PRESSURE: 120 MMHG

## 2023-12-05 DIAGNOSIS — Z01.419 ENCOUNTER FOR GYNECOLOGICAL EXAMINATION (GENERAL) (ROUTINE) W/OUT ABNORMAL FINDINGS: ICD-10-CM

## 2023-12-05 PROCEDURE — 99212 OFFICE O/P EST SF 10 MIN: CPT

## 2023-12-05 PROCEDURE — 99397 PER PM REEVAL EST PAT 65+ YR: CPT

## 2023-12-05 RX ORDER — GLUC/MSM/COLGN2/HYAL/ANTIARTH3 375-375-20
TABLET ORAL
Refills: 0 | Status: DISCONTINUED | COMMUNITY
End: 2023-12-05

## 2023-12-05 RX ORDER — LATANOPROST/PF 0.005 %
0.01 DROPS OPHTHALMIC (EYE)
Refills: 0 | Status: DISCONTINUED | COMMUNITY
End: 2023-12-05

## 2023-12-05 RX ORDER — FLUOCINONIDE 0.5 MG/G
0.05 CREAM TOPICAL
Qty: 30 | Refills: 0 | Status: DISCONTINUED | COMMUNITY
Start: 2022-09-13 | End: 2023-12-05

## 2023-12-11 PROBLEM — Z01.419 ENCOUNTER FOR GYNECOLOGICAL EXAMINATION WITHOUT ABNORMAL FINDING: Status: ACTIVE | Noted: 2021-08-16
